# Patient Record
Sex: FEMALE | Race: WHITE | Employment: OTHER | URBAN - METROPOLITAN AREA
[De-identification: names, ages, dates, MRNs, and addresses within clinical notes are randomized per-mention and may not be internally consistent; named-entity substitution may affect disease eponyms.]

---

## 2021-06-28 ENCOUNTER — OFFICE VISIT (OUTPATIENT)
Dept: PODIATRY | Age: 61
End: 2021-06-28
Payer: COMMERCIAL

## 2021-06-28 VITALS — BODY MASS INDEX: 27.31 KG/M2 | WEIGHT: 160 LBS | HEIGHT: 64 IN

## 2021-06-28 DIAGNOSIS — M24.20 LIGAMENT LAXITY: Primary | ICD-10-CM

## 2021-06-28 DIAGNOSIS — M54.50 LUMBAR PAIN: ICD-10-CM

## 2021-06-28 PROCEDURE — 99203 OFFICE O/P NEW LOW 30 MIN: CPT | Performed by: PODIATRIST

## 2021-06-28 RX ORDER — HYDROCHLOROTHIAZIDE 12.5 MG/1
12.5 TABLET ORAL DAILY
COMMUNITY
Start: 2021-02-03

## 2021-06-28 RX ORDER — LORATADINE 10 MG/1
10 TABLET ORAL DAILY
COMMUNITY

## 2021-06-28 RX ORDER — MONTELUKAST SODIUM 10 MG/1
10 TABLET ORAL DAILY
COMMUNITY
Start: 2021-06-24

## 2021-06-28 RX ORDER — VIT C/B6/B5/MAGNESIUM/HERB 173 50-5-6-5MG
500 CAPSULE ORAL
COMMUNITY

## 2021-06-28 RX ORDER — ALENDRONATE SODIUM 70 MG/1
70 TABLET ORAL
COMMUNITY
Start: 2020-07-14

## 2021-06-28 RX ORDER — TRAZODONE HYDROCHLORIDE 100 MG/1
100 TABLET ORAL NIGHTLY
COMMUNITY
Start: 2021-02-03

## 2021-06-28 RX ORDER — MOMETASONE FUROATE 50 UG/1
2 SPRAY, METERED NASAL DAILY
COMMUNITY

## 2021-06-28 RX ORDER — LIDOCAINE 50 MG/G
1 PATCH TOPICAL EVERY 24 HOURS
COMMUNITY
Start: 2021-02-03

## 2021-06-28 RX ORDER — LOSARTAN POTASSIUM 100 MG/1
TABLET ORAL
COMMUNITY
Start: 2021-03-24

## 2021-06-28 RX ORDER — ESTRADIOL 0.1 MG/G
2 CREAM VAGINAL DAILY
COMMUNITY

## 2021-06-28 NOTE — PROGRESS NOTES
30 [facility] [address]18 [address] [facility] 36.  Dept: [phone]    NEW PATIENT PROGRESS NOTE  Date of patient's visit: 6/28/2021  Patient's Name:  Toña Patel YOB: 1960            No care team member to display        Chief Complaint   Patient presents with    New Patient    Foot Pain     bl with left more severe x several years         HPI:   Toña Patel is a 62 y.o. female who presents to the office today complaining of bilateral foot pain with he left more severe. Symptoms began several year(s) ago. Patient relates pain is Present. Pain is rated 2 out of 10 and is described as intermittent. Treatments prior to today's visit include: tylenol for pain. Currently denies F/C/N/V. Pt's primary care physician is Vinicius Chand last seen February 3 2021   She has pain to the light touch and it radiates all the way down her leg. All of her nerves are sensative all stemming from her back. Pt was diagnosed with fibromyalgia in the 90's but has not had her back checked   She states steroids have helped when she takes them orally     Allergies   Allergen Reactions    Codeine     Penicillins     Sulfa Antibiotics        History reviewed. No pertinent past medical history. Prior to Admission medications    Medication Sig Start Date End Date Taking?  Authorizing Provider   alendronate (FOSAMAX) 70 MG tablet Take 70 mg by mouth every 7 days 7/14/20  Yes Historical Provider, MD   hydroCHLOROthiazide (HYDRODIURIL) 12.5 MG tablet Take 12.5 mg by mouth daily 2/3/21  Yes Historical Provider, MD   lidocaine (LIDODERM) 5 % Place 1 patch onto the skin every 24 hours 2/3/21  Yes Historical Provider, MD   losartan (COZAAR) 100 MG tablet TAKE 1 TABLET DAILY 3/24/21  Yes Historical Provider, MD   montelukast (SINGULAIR) 10 MG tablet Take 10 mg by mouth daily 6/24/21  Yes Historical Provider, MD   traZODone (DESYREL) 100 MG tablet Take 100 mg by mouth nightly 2/3/21  Yes Historical Provider, MD   estradiol (ESTRACE) 0.1 MG/GM vaginal cream Place 2 g vaginally daily   Yes Historical Provider, MD   Omega-3 Fatty Acids (FISH OIL CONCENTRATE PO) Take by mouth   Yes Historical Provider, MD   loratadine (CLARITIN) 10 MG tablet Take 10 mg by mouth daily   Yes Historical Provider, MD   mometasone (NASONEX) 50 MCG/ACT nasal spray 2 sprays by Each Nostril route daily   Yes Historical Provider, MD   Multiple Vitamin (MULTIVITAMIN ADULT PO) Take by mouth   Yes Historical Provider, MD   Turmeric 500 MG CAPS Take 500 mg by mouth   Yes Historical Provider, MD       History reviewed. No pertinent surgical history. History reviewed. No pertinent family history. Social History     Tobacco Use    Smoking status: Never Smoker    Smokeless tobacco: Never Used   Substance Use Topics    Alcohol use: Not on file       Review of Systems    Review of Systems:   History obtained from chart review and the patient  General ROS: negative for - chills, fatigue, fever, night sweats or weight gain  Constitutional: Negative for chills, diaphoresis, fatigue, fever and unexpected weight change. Musculoskeletal: Positive for arthralgias, gait problem and joint swelling. Neurological ROS: negative for - behavioral changes, confusion, headaches or seizures. Negative for weakness and numbness. Dermatological ROS: negative for - mole changes, rash  Cardiovascular: Negative for leg swelling. Gastrointestinal: Negative for constipation, diarrhea, nausea and vomiting. Lower Extremity Physical Examination:   Vitals: There were no vitals filed for this visit. General: AAO x 3 in NAD. Dermatologic Exam:  Skin lesion/ulceration Absent . Skin No rashes or nodules noted. .       Musculoskeletal:       Patient is very hypermobile and has a very flexible forefoot  1st MPJ ROM decreased, Bilateral.  Muscle strength 5/5, Bilateral.  Pain present upon palpation of right and left common peroneal nerves. And deep peroneal nerves. Pain goes all the way down the leg. Medial longitudinal arch, Bilateral WNL. Ankle ROM WNL,Bilateral.    Dorsally contracted digits absent digits 1-5 Bilateral.     Vascular: DP and PT pulses palpable 2/4, Bilateral.  CFT <3 seconds, Bilateral.  Hair growth present to the level of the digits, Bilateral.  Edema absent, Bilateral.  Varicosities absent, Bilateral. Erythema absent, Bilateral    Neurological: Sensation intact to light touch to level of digits, Bilateral.  Protective sensation intact 10/10 sites via 5.07/10g [place]-Dena Monofilament, Bilateral.  negative Tinel's, Bilateral.  negative Valleix sign, Bilateral.      Integument: Warm, dry, supple, Bilateral.  Open lesion absent, Bilateral.  Interdigital maceration absent to web spaces 1-4, Bilateral.  Nails are normal in length, thickness and color 1-5 bilateral.  Fissures absent, Bilateral.     Asessment: Patient is a 62 y.o. female with:    Diagnosis Orders   1. Ligament laxity  Yocasta Urban MD, Pain Management, [place]   2. Lumbar pain  Jacob Pope MD, Pain Management, [place]         Plan: Patient examined and evaluated. Current condition and treatment options discussed in detail. Discussed conservative and surgical options with the patient. Advised pt to her condition. I am leaning against fibromyalgia and feel the pain is stemming from her lower lumbar. She has severe ligamentous laxity to both feet and to multiple joints of the foot and wrist.      I am referring her to a colleague that can assess her lumbar pain and see if her nerve pain stems from   . Verbal and written instructions given to patient. Contact office with any questions/problems/concerns. RTC in 4week(s)       Arch Pain: Exercises  Introduction  Here are some examples of exercises for you to try. The exercises may be suggested for a condition or for rehabilitation. Start each exercise slowly.  Ease off the exercises if you start to have pain. You will be told when to start these exercises and which ones will work best for you. How to do the exercises  Plantar fascia stretch    1. Sit in a chair and put your affected foot on your other knee. 2. Hold the heel of your foot in one hand, and grasp your toes with the other hand. 3. Pull on your heel (toward your body), and at the same time pull your toes back with your other hand. 4. You should feel a stretch along the bottom of your foot. 5. Hold 15 to 30 seconds. 6. Repeat 2 to 4 times. Plantar fascia stretch (kneeling)    1. Get on your hands and knees on the floor. Keep your heels pointing up and the balls of your feet and your toes on the floor. 2. Slowly sit back toward your ankles. 3. If this is too hard, you can try doing it one leg at a time. Stand up, and then kneel on one knee and keep the other leg forward. Place the foot of your forward leg flat on the ground and bend that knee. The heel on the leg still behind you should point up. The ball and toes of that foot should be on the floor. Sit back toward that ankle. 4. Hold 15 to 30 seconds. 5. Repeat 2 to 4 times. Switch legs if you are doing this one leg at a time. Plantar fascia self-massage    1. Sit in a chair. 2. Place your affected foot on a firm, tube-shaped object, such as a can or water bottle. 3. Roll your foot back and forth over the object to massage the bottom of your foot. 4. If you want to do ice massage, fill a water bottle about three-fourths of the way full and freeze before using. 5. Continue for 2 to 5 minutes. Bilateral calf stretch (knees straight)    1. Place a book on the floor a few inches from a wall or countertop, and put the balls of your feet on it. Your heels should be on the floor. The book needs to be thick enough so that you can feel a gentle stretch in each calf.  If you are not steady on your feet, hold on to a chair, counter, or wall while you do this stretch. 2. Keep your knees straight, and lean forward until you feel a stretch in each calf. 3. To get more stretch, add another book or use a thicker book, such as a phone book, a dictionary, or an encyclopedia. 4. Hold the stretch for at least 15 to 30 seconds. 5. Repeat 2 to 4 times. Bilateral calf stretch (knees bent)    1. Place a book on the floor a few inches from a wall or countertop, and put the balls of your feet on it. Your heels should be on the floor. The book needs to be thick enough so that you can feel a gentle stretch in each calf. If you are not steady on your feet, hold on to a chair, counter, or wall while you do this stretch. 2. Bend your knees, and lean forward until you feel a stretch in each calf. 3. To get more stretch, add another book or use a thicker book, such as a phone book, a dictionary, or an encyclopedia. 4. Hold the stretch for at least 15 to 30 seconds. 5. Repeat 2 to 4 times. Watkins pick-ups    1. Put some marbles on the floor next to a cup.  2. Sit down, and use the toes of your affected foot to lift up one marble from the floor at a time. Then try to put the marble in the cup.  3. Repeat 8 to 12 times. Towel scrunches    1. Sit down, and place your affected foot on a towel on the floor. You may also do this with both feet on the towel. 2. Scrunch the towel toward you with your toes. Then use your toes to push the towel back into place. 3. Repeat 8 to 12 times. Heel raises on a step    1. Stand on the bottom step of a staircase, facing up toward the stairs. Put the balls of your feet on the step. If you are not steady on your feet, hold on to the banister or wall. 2. Keeping both knees straight, slowly lift your heels above the step so that you are standing on your toes. Then slowly lower your heels below the step and toward the floor. 3. Return to the starting position, with your feet even with the step. 4. Repeat 8 to 12 times.

## 2022-06-06 ENCOUNTER — OFFICE VISIT (OUTPATIENT)
Dept: PODIATRY | Age: 62
End: 2022-06-06
Payer: COMMERCIAL

## 2022-06-06 VITALS — HEIGHT: 64 IN | WEIGHT: 160 LBS | BODY MASS INDEX: 27.31 KG/M2

## 2022-06-06 DIAGNOSIS — M19.079 ARTHRITIS OF MIDFOOT: ICD-10-CM

## 2022-06-06 DIAGNOSIS — M67.479 GANGLION CYST OF FOOT: ICD-10-CM

## 2022-06-06 DIAGNOSIS — L03.031 PARONYCHIA OF GREAT TOE OF RIGHT FOOT: Primary | ICD-10-CM

## 2022-06-06 DIAGNOSIS — M79.604 PAIN OF RIGHT LOWER EXTREMITY: ICD-10-CM

## 2022-06-06 PROCEDURE — 99214 OFFICE O/P EST MOD 30 MIN: CPT | Performed by: PODIATRIST

## 2022-06-06 PROCEDURE — 11750 EXCISION NAIL&NAIL MATRIX: CPT | Performed by: PODIATRIST

## 2022-06-06 NOTE — PROGRESS NOTES
[address] [facility] PODIATRY [facility]  [address] [address] [place] [address]  Dept: [phone]  Dept Fax: [phone]    RETURN PATIENT PROGRESS NOTE  Date of patient's visit: 6/6/2022  Patient's Name:  Lolita Barthel YOB: 1960            Patient Care Team:  Valeriano Gifford as PCP - General       Lolita Barthel 65 y.o. female that presents for follow-up of   Chief Complaint   Patient presents with    Toe Pain     right great toe pain for a few months     Pt's primary care physician is Valeriano Gifford last seen 02/03/2021  Symptoms began 3 month(s) ago and are decreased . Patient relates pain is Present to right great toenail. Pain is rated 1 out of 10 and is described as intermittent, mild. Treatments prior to today's visit include: none. Currently denies F/C/N/V. Allergies   Allergen Reactions    Codeine     Penicillins     Sulfa Antibiotics        No past medical history on file. Prior to Admission medications    Medication Sig Start Date End Date Taking?  Authorizing Provider   alendronate (FOSAMAX) 70 MG tablet Take 70 mg by mouth every 7 days 7/14/20  Yes Historical Provider, MD   hydroCHLOROthiazide (HYDRODIURIL) 12.5 MG tablet Take 12.5 mg by mouth daily 2/3/21  Yes Historical Provider, MD   lidocaine (LIDODERM) 5 % Place 1 patch onto the skin every 24 hours 2/3/21  Yes Historical Provider, MD   losartan (COZAAR) 100 MG tablet TAKE 1 TABLET DAILY 3/24/21  Yes Historical Provider, MD   montelukast (SINGULAIR) 10 MG tablet Take 10 mg by mouth daily 6/24/21  Yes Historical Provider, MD   traZODone (DESYREL) 100 MG tablet Take 100 mg by mouth nightly 2/3/21  Yes Historical Provider, MD   estradiol (ESTRACE) 0.1 MG/GM vaginal cream Place 2 g vaginally daily   Yes Historical Provider, MD   Omega-3 Fatty Acids (FISH OIL CONCENTRATE PO) Take by mouth   Yes Historical Provider, MD   loratadine (CLARITIN) 10 MG tablet Take 10 mg by mouth daily   Yes Historical Provider, MD   mometasone (NASONEX) 50 MCG/ACT nasal spray 2 sprays by Each Nostril route daily   Yes Historical Provider, MD   Multiple Vitamin (MULTIVITAMIN ADULT PO) Take by mouth   Yes Historical Provider, MD   Turmeric 500 MG CAPS Take 500 mg by mouth   Yes Historical Provider, MD       Review of Systems    Review of Systems:  History obtained from chart review and the patient  General ROS: negative for - chills, fatigue, fever, night sweats or weight gain  Constitutional: Negative for chills, diaphoresis, fatigue, fever and unexpected weight change. Musculoskeletal: Positive for arthralgias, gait problem and joint swelling. Neurological ROS: negative for - behavioral changes, confusion, headaches or seizures. Negative for weakness and numbness. Dermatological ROS: negative for - mole changes, rash  Cardiovascular: Negative for leg swelling. Gastrointestinal: Negative for constipation, diarrhea, nausea and vomiting. Lower Extremity Physical Examination:     Vitals: There were no vitals filed for this visit. General: AAO x 3 in NAD. Dermatologic Exam:  Right lateral hallux nail is incurvated with edema, erythema    Musculoskeletal:     1st MPJ ROM decreased, Bilateral. There is noted soft tissue, alicia exostosis to dorsal medial left foot  Muscle strength 5/5, Bilateral.  Pain present upon palpation of left foot dorsally. Medial longitudinal arch, Bilateral WNL.   Ankle ROM WNL,Bilateral.    Dorsally contracted digits absent digits 1-5 Bilateral.     Vascular: DP and PT pulses palpable 2/4, Bilateral.  CFT <3 seconds, Bilateral.  Hair growth present to the level of the digits, Bilateral.  Edema absent, Bilateral.  Varicosities absent, Bilateral. Erythema absent, Bilateral    Neurological: Sensation intact to light touch to level of digits, Bilateral.  Protective sensation intact 10/10 sites via 5.07/10g [place]-Dena Monofilament, Bilateral.  negative Tinel's, Bilateral.  negative Valleix sign, Bilateral.      Integument: Warm, dry, supple, Bilateral.  Open lesion absent, Bilateral.  Interdigital maceration absent to web spaces 1-4, Bilateral.  Nails are normal in length, thickness and color 1-5 bilateral.  Fissures absent, Bilateral.       Asessment: Patient is a 65 y.o. female with:    Diagnosis Orders   1. Paronychia of great toe of right foot  CT REMOVAL OF NAIL BED   2. Pain of right lower extremity  CT REMOVAL OF NAIL BED   3. Arthritis of midfoot     4. Ganglion cyst of foot           Plan: Patient examined and evaluated. Current condition and treatment options discussed in detail. Verbal consent obtained for chemical matrixectomy after all questions answered. Local anesthesia obtained via Hallux block to the Right hallux utilizing 5 cc of 1% Lidocaine plain. Next the Right hallux was prepped with Betadine. A digital tourniquet was applied. A freer elevator was used to free the lateral nail border. An english anvil was used to remove the offending border in total.  A curette was used to ensure the offending border was free of any remaining nail. Next, three 30 second applications of 13% Phenol were applied to the offending nail border followed by an isopropyl alcohol flush. Triple antibiotic ointment was applied to the nail border followed by a semi-compressive dressing. The patient was instructed to leave this dressing clean/dry/intact until the following am at which point they will begin warm epsom salt soaks followed by application of antibiotic ointment and Band-Aid BID. Patient instructed to take Tylenol PRN pain. Post-operative chemical matrixectomy instruction sheet dispensed to patient. Advised pt to consider xray for surgical planning left foot lesion. Verbal and written instructions given to patient. Contact office with any questions/problems/concerns.      No orders of the defined types were placed in this encounter. No orders of the defined types were placed in this encounter. RTC in 1week(s).     6/6/2022      Electronically signed by Carley Bunn DPM on 6/6/2022 at 10:59 AM  6/6/2022

## 2022-06-15 ENCOUNTER — OFFICE VISIT (OUTPATIENT)
Dept: PODIATRY | Age: 62
End: 2022-06-15

## 2022-06-15 VITALS — HEIGHT: 64 IN | BODY MASS INDEX: 27.31 KG/M2 | WEIGHT: 160 LBS

## 2022-06-15 DIAGNOSIS — M79.672 LEFT FOOT PAIN: Primary | ICD-10-CM

## 2022-06-15 DIAGNOSIS — M19.079 ARTHRITIS OF MIDFOOT: ICD-10-CM

## 2022-06-15 DIAGNOSIS — M67.472 GANGLION CYST OF LEFT FOOT: ICD-10-CM

## 2022-06-15 PROCEDURE — 99024 POSTOP FOLLOW-UP VISIT: CPT | Performed by: PODIATRIST

## 2022-06-15 NOTE — PROGRESS NOTES
[address] [facility]RY [facility]  [address] [address] [place] [address]  Dept: [phone]  Dept Fax: [phone]    POST-OP PROGRESS NOTE  Date of patient's visit: 6/15/2022  Patient's Name:  Shante Pryor YOB: 1960            Patient Care Team:  Amador Maynard as PCP - General        Chief Complaint   Patient presents with    Post-Op Check     1 wk post op    Ingrown Toenail     right great toe       Pt's primary care physician is Amador Maynard last seen 06/13/2022     Subjective: Shante Pryor is a 65 y.o. female who presents to the office today 1week(s)  S/P right hallux P&A   Problem List Items Addressed This Visit     None      . Patient relates pain is Present and improved. Pain is rated 5 out of 10 and is described as intermittent. Currently denies F/C/N/V. She also c/o pain to left foot along bony bump to top of her midfoot. Physical Examination:  Dermatologic Exam:  Skin lesion/ulceration Absent . Skin No rashes or nodules noted. .       Musculoskeletal:     1st MPJ ROM decreased, Bilateral. +cyst noted to dorsal medial midfoot. Muscle strength 5/5, Bilateral.  Pain present upon palpation of left foot bump to dorsal foot. Medial longitudinal arch, Bilateral WNL.   Ankle ROM WNL,Bilateral.    Dorsally contracted digits absent digits 1-5 Bilateral.     Vascular: DP and PT pulses palpable 2/4, Bilateral.  CFT <3 seconds, Bilateral.  Hair growth present to the level of the digits, Bilateral.  Edema absent, Bilateral.  Varicosities absent, Bilateral. Erythema absent, Bilateral    Neurological: Sensation intact to light touch to level of digits, Bilateral.  Protective sensation intact 10/10 sites via 5.07/10g [place]-Dena Monofilament, Bilateral.  negative Tinel's, Bilateral.  negative Valleix sign, Bilateral.      Integument: Warm, dry, supple, Bilateral.  Open lesion absent, Bilateral.  Interdigital maceration absent to web spaces 1-4, Bilateral.  Nails are normal in length, thickness and color 1-5 bilateral.  Fissures absent, Bilateral.       Assessment: Elenobonnie Labrum is status post as above  Normal post operative course. Doing well   Diagnosis Orders   1. Left foot pain  XR FOOT LEFT (MIN 3 VIEWS)   2. Arthritis of midfoot     3. Ganglion cyst of left foot             No diagnosis found. Plan:  Patient examined and evaluated. Current condition and treatment options discussed in detail. Advised pt to get xrays, left foot for further evaluation and possible surgical planning. Soak right foot once daily and monitor for infection. Verbal and written instructions given to patient. Orders: No orders of the defined types were placed in this encounter. Contact office with any questions/problems/concerns. RTC in 2week(s).      Electronically signed by Radha Dang DPM on 6/15/2022 at 1:19 PM  6/15/2022

## 2022-09-08 ENCOUNTER — HOSPITAL ENCOUNTER (OUTPATIENT)
Facility: CLINIC | Age: 62
Discharge: HOME | End: 2022-09-10
Payer: COMMERCIAL

## 2022-09-08 ENCOUNTER — HOSPITAL ENCOUNTER (OUTPATIENT)
Dept: GENERAL RADIOLOGY | Facility: CLINIC | Age: 62
Discharge: HOME | End: 2022-09-10
Payer: COMMERCIAL

## 2022-09-08 DIAGNOSIS — M79.672 LEFT FOOT PAIN: ICD-10-CM

## 2022-09-08 PROCEDURE — 73630 X-RAY EXAM OF FOOT: CPT

## 2022-10-19 ENCOUNTER — OFFICE VISIT (OUTPATIENT)
Dept: PODIATRY | Age: 62
End: 2022-10-19
Payer: COMMERCIAL

## 2022-10-19 VITALS — HEIGHT: 64 IN | WEIGHT: 160 LBS | BODY MASS INDEX: 27.31 KG/M2

## 2022-10-19 DIAGNOSIS — M79.604 PAIN OF RIGHT LOWER EXTREMITY: ICD-10-CM

## 2022-10-19 DIAGNOSIS — M19.079 ARTHRITIS OF MIDFOOT: ICD-10-CM

## 2022-10-19 DIAGNOSIS — M79.672 LEFT FOOT PAIN: Primary | ICD-10-CM

## 2022-10-19 DIAGNOSIS — M24.20 LIGAMENT LAXITY: ICD-10-CM

## 2022-10-19 PROCEDURE — 99213 OFFICE O/P EST LOW 20 MIN: CPT | Performed by: PODIATRIST

## 2022-10-19 NOTE — PATIENT INSTRUCTIONS
Schedule a Vaccine  When you qualify to receive the vaccine, call the [facility]) COVID-19 Vaccination Hotline to schedule your appointment or to get additional information about the [facility]) locations which are offering the COVID-19 vaccine. To be 94% effective, it's important that you receive two doses of one of the COVID-19 vaccines. -If you are receiving the Rey Peter vaccine, your second shot will be scheduled as close to 21 days after the first shot as possible. -If you are receiving the Moderna vaccine, your second shot will be scheduled as close to 28 days after the first shot as possible. [facility]) COVID-19 Vaccination Hotline: [phone]    Links to [facility]) website and [facility] website:    [email or web address]    [email or web address]

## 2022-10-19 NOTE — PROGRESS NOTES
[address] [facility] PODIATRY [facility]  [address] [facility] [address] [place] [address]  Dept: [phone]  Dept Fax: [phone]    RETURN PATIENT PROGRESS NOTE  Date of patient's visit: 10/19/2022  Patient's Name:  Jonnie Boucher YOB: 1960            Patient Care Team:  Carol Hernandez as PCP - General       Jonnie Boucher 65 y.o. female that presents for follow-up of   Chief Complaint   Patient presents with    Foot Pain     Bilateral foot - rtc 4 - 5 months       Symptoms began 5 month(s) ago and are unchanged . Patient relates pain is Present. Pain is rated 5 out of 10 and is described as constant, mild, moderate. Treatments prior to today's visit include: previous podiatry treatment. Currently denies F/C/N/V. Allergies   Allergen Reactions    Codeine     Penicillins     Sulfa Antibiotics        No past medical history on file. Prior to Admission medications    Medication Sig Start Date End Date Taking?  Authorizing Provider   alendronate (FOSAMAX) 70 MG tablet Take 70 mg by mouth every 7 days 7/14/20  Yes Historical Provider, MD   hydroCHLOROthiazide (HYDRODIURIL) 12.5 MG tablet Take 12.5 mg by mouth daily 2/3/21  Yes Historical Provider, MD   lidocaine (LIDODERM) 5 % Place 1 patch onto the skin every 24 hours 2/3/21  Yes Historical Provider, MD   losartan (COZAAR) 100 MG tablet TAKE 1 TABLET DAILY 3/24/21  Yes Historical Provider, MD   montelukast (SINGULAIR) 10 MG tablet Take 10 mg by mouth daily 6/24/21  Yes Historical Provider, MD   traZODone (DESYREL) 100 MG tablet Take 100 mg by mouth nightly 2/3/21  Yes Historical Provider, MD   estradiol (ESTRACE) 0.1 MG/GM vaginal cream Place 2 g vaginally daily   Yes Historical Provider, MD   Omega-3 Fatty Acids (FISH OIL CONCENTRATE PO) Take by mouth   Yes Historical Provider, MD   loratadine (CLARITIN) 10 MG tablet Take 10 mg by mouth daily   Yes Historical Provider, MD   mometasone (NASONEX) 50 MCG/ACT nasal spray 2 sprays by Each Nostril route daily   Yes Historical Provider, MD   Multiple Vitamin (MULTIVITAMIN ADULT PO) Take by mouth   Yes Historical Provider, MD   Turmeric 500 MG CAPS Take 500 mg by mouth   Yes Historical Provider, MD       Review of Systems    Review of Systems:  History obtained from chart review and the patient  General ROS: negative for - chills, fatigue, fever, night sweats or weight gain  Constitutional: Negative for chills, diaphoresis, fatigue, fever and unexpected weight change. Musculoskeletal: Positive for arthralgias, gait problem and joint swelling. Neurological ROS: negative for - behavioral changes, confusion, headaches or seizures. Negative for weakness and numbness. Dermatological ROS: negative for - mole changes, rash  Cardiovascular: Negative for leg swelling. Gastrointestinal: Negative for constipation, diarrhea, nausea and vomiting. Lower Extremity Physical Examination:     Vitals: There were no vitals filed for this visit. General: AAO x 3 in NAD. Dermatologic Exam:  Skin lesion/ulceration Absent . Skin No rashes or nodules noted. .       Musculoskeletal:     1st MPJ ROM decreased, Bilateral.  Muscle strength 5/5, Bilateral.  Pain present upon palpation of plantar arch and sub 2-5 MTH and plantar heel. Medial longitudinal arch, Bilateral WNL.   Ankle ROM WNL,Bilateral.    Dorsally contracted digits absent digits 1-5 Bilateral.     Vascular: DP and PT pulses palpable 2/4, Bilateral.  CFT <3 seconds, Bilateral.  Hair growth present to the level of the digits, Bilateral.  Edema absent, Bilateral.  Varicosities absent, Bilateral. Erythema absent, Bilateral    Neurological: Sensation intact to light touch to level of digits, Bilateral.  Protective sensation intact 10/10 sites via 5.07/10g [place]ena Monofilament, Bilateral.  negative Tinel's, Bilateral.  negative Valleix sign, Bilateral.      Integument: Warm, dry, supple, Bilateral.  Open lesion absent, Bilateral.  Interdigital maceration absent to web spaces 1-4, Bilateral.  Nails are normal in length, thickness and color 1-5 bilateral.  Fissures absent, Bilateral.     Xrays, left foot, 3 views: There is no acute osseous abnormality. The joint spaces are maintained. The   surrounding soft tissues are unremarkable. Asessment: Patient is a 65 y.o. female with:    Diagnosis Orders   1. Left foot pain  Rena Covington MD, Pain Management, [place]      2. Pain of right lower extremity  Rena oCvington MD, Pain Management, [address] [place] [address]      3. Arthritis of midfoot        4. Ligament laxity              Plan: Patient examined and evaluated. Current condition and treatment options discussed in detail. Advised pt to see pain management and referral placed today. As patient struggles with fibromyalgia, she is experiencing significant back pain. For the DJD  Recommend OTC anti inflammatories. RICE therapy if pain worsens after activity   Recommend proper shoe gear with supportive archs. All labs were reviewed and all imagining including the above findings were reviewed PRIOR to the patients arrival and with the patient today. Previous patient encounter was reviewed. Encounters from the patients other medical providers were reviewed and noted. Time was spent educating the patient on proper care of the feet and ankles. All the above diagnosis were addressed at todays visit and all questions were answered. A total of 25 minutes was spent with this patients encounter which included charting after the patients visit     Verbal and written instructions given to patient. Contact office with any questions/problems/concerns. No orders of the defined types were placed in this encounter. No orders of the defined types were placed in this encounter. RTC in 2month(s).     10/19/2022      Electronically signed by Dakotah Masters DPM on 10/19/2022 at 3:29 PM  10/19/2022

## 2023-01-30 ENCOUNTER — OFFICE VISIT (OUTPATIENT)
Dept: PODIATRY | Age: 63
End: 2023-01-30
Payer: COMMERCIAL

## 2023-01-30 VITALS — WEIGHT: 160 LBS | BODY MASS INDEX: 27.31 KG/M2 | HEIGHT: 64 IN

## 2023-01-30 DIAGNOSIS — M79.604 PAIN OF RIGHT LOWER EXTREMITY: Primary | ICD-10-CM

## 2023-01-30 DIAGNOSIS — L03.031 PARONYCHIA OF GREAT TOE OF RIGHT FOOT: ICD-10-CM

## 2023-01-30 PROCEDURE — 99214 OFFICE O/P EST MOD 30 MIN: CPT | Performed by: PODIATRIST

## 2023-01-30 PROCEDURE — 11750 EXCISION NAIL&NAIL MATRIX: CPT | Performed by: PODIATRIST

## 2023-02-01 NOTE — PROGRESS NOTES
[address] [facility] PODIATRY [facility]  [address] [address] [facility] [address]  Dept: [phone]  Dept Fax: [phone]    RETURN PATIENT PROGRESS NOTE  Date of patient's visit: 2/1/2023  Patient's Name:  Demario Smith YOB: 1960            Patient Care Team:  Franca Fu as PCP - General       Demario Smith 65 y.o. female that presents for follow-up of   Chief Complaint   Patient presents with    Ingrown Toenail     Possible ingrown of great toe of right foot      Pt's primary care physician is Franca Fu last seen 02/03/2021  Symptoms began 3 month(s) ago and are decreased . Patient relates pain is Present to right great toenail. Pain is rated 1 out of 10 and is described as intermittent, mild. Treatments prior to today's visit include: none. Currently denies F/C/N/V. Allergies   Allergen Reactions    Codeine     Penicillins     Sulfa Antibiotics        No past medical history on file. Prior to Admission medications    Medication Sig Start Date End Date Taking?  Authorizing Provider   tretinoin (RETIN-A) 0.025 % cream  1/2/23  Yes Historical Provider, MD   alendronate (FOSAMAX) 70 MG tablet Take 70 mg by mouth every 7 days 7/14/20  Yes Historical Provider, MD   hydroCHLOROthiazide (HYDRODIURIL) 12.5 MG tablet Take 12.5 mg by mouth daily 2/3/21  Yes Historical Provider, MD   lidocaine (LIDODERM) 5 % Place 1 patch onto the skin every 24 hours 2/3/21  Yes Historical Provider, MD   losartan (COZAAR) 100 MG tablet TAKE 1 TABLET DAILY 3/24/21  Yes Historical Provider, MD   montelukast (SINGULAIR) 10 MG tablet Take 10 mg by mouth daily 6/24/21  Yes Historical Provider, MD   traZODone (DESYREL) 100 MG tablet Take 100 mg by mouth nightly 2/3/21  Yes Historical Provider, MD   estradiol (ESTRACE) 0.1 MG/GM vaginal cream Place 2 g vaginally daily   Yes Historical Provider, MD   Omega-3 Fatty Acids (FISH OIL CONCENTRATE PO) Take by mouth   Yes Historical Provider, MD   loratadine (CLARITIN) 10 MG tablet Take 10 mg by mouth daily   Yes Historical Provider, MD   mometasone (NASONEX) 50 MCG/ACT nasal spray 2 sprays by Each Nostril route daily   Yes Historical Provider, MD   Multiple Vitamin (MULTIVITAMIN ADULT PO) Take by mouth   Yes Historical Provider, MD   Turmeric 500 MG CAPS Take 500 mg by mouth   Yes Historical Provider, MD       Review of Systems    Review of Systems:  History obtained from chart review and the patient  General ROS: negative for - chills, fatigue, fever, night sweats or weight gain  Constitutional: Negative for chills, diaphoresis, fatigue, fever and unexpected weight change. Musculoskeletal: Positive for arthralgias, gait problem and joint swelling. Neurological ROS: negative for - behavioral changes, confusion, headaches or seizures. Negative for weakness and numbness. Dermatological ROS: negative for - mole changes, rash  Cardiovascular: Negative for leg swelling. Gastrointestinal: Negative for constipation, diarrhea, nausea and vomiting. Lower Extremity Physical Examination:     Vitals: There were no vitals filed for this visit. General: AAO x 3 in NAD. Dermatologic Exam:  Right lateral hallux nail is incurvated with edema, erythema    Musculoskeletal:     1st MPJ ROM decreased, Bilateral. There is noted soft tissue, alicia exostosis to dorsal medial left foot  Muscle strength 5/5, Bilateral.  Pain present upon palpation of left foot dorsally. Medial longitudinal arch, Bilateral WNL.   Ankle ROM WNL,Bilateral.    Dorsally contracted digits absent digits 1-5 Bilateral.     Vascular: DP and PT pulses palpable 2/4, Bilateral.  CFT <3 seconds, Bilateral.  Hair growth present to the level of the digits, Bilateral.  Edema absent, Bilateral.  Varicosities absent, Bilateral. Erythema absent, Bilateral    Neurological: Sensation intact to light touch to level of digits, Bilateral.  Protective sensation intact 10/10 sites via 5.07/10g [place]-Dena Monofilament, Bilateral.  negative Tinel's, Bilateral.  negative Valleix sign, Bilateral.      Integument: Warm, dry, supple, Bilateral.  Open lesion absent, Bilateral.  Interdigital maceration absent to web spaces 1-4, Bilateral.  Nails are normal in length, thickness and color 1-5 bilateral.  Fissures absent, Bilateral.       Asessment: Patient is a 62 y.o. female with:    Diagnosis Orders   1. Pain of right lower extremity  FL EXCISION NAIL MATRIX PERMANENT REMOVAL      2. Paronychia of great toe of right foot  FL EXCISION NAIL MATRIX PERMANENT REMOVAL            Plan: Patient examined and evaluated.  Current condition and treatment options discussed in detail.       Verbal consent obtained for chemical matrixectomy after all questions answered.  Local anesthesia obtained via Hallux block to the Right hallux utilizing 5 cc of 1% Lidocaine plain.  Next the Right hallux was prepped with Betadine.  A digital tourniquet was applied.  A freer elevator was used to free the lateral nail border.  An english anvil was used to remove the offending border in total.  A curette was used to ensure the offending lateral border was free of any remaining nail.  Next, three 30 second applications of 89% Phenol were applied to the offending nail border followed by an isopropyl alcohol flush.  Triple antibiotic ointment was applied to the nail border followed by a semi-compressive dressing.      The patient was instructed to leave this dressing clean/dry/intact until the following am at which point they will begin warm epsom salt soaks followed by application of antibiotic ointment and Band-Aid BID.  Patient instructed to take Tylenol PRN pain. Post-operative chemical matrixectomy instruction sheet dispensed to patient.            No orders of the defined types were placed in this encounter.    No orders of the defined types were placed in this encounter.       RTC in 1week(s).   1/30/2023      Electronically signed by Baldo You DPM on 2/1/2023 at 11:22 AM  1/30/2023

## 2023-02-14 ENCOUNTER — OFFICE VISIT (OUTPATIENT)
Dept: PODIATRY | Age: 63
End: 2023-02-14

## 2023-02-14 VITALS — HEIGHT: 64 IN | BODY MASS INDEX: 27.31 KG/M2 | WEIGHT: 160 LBS

## 2023-02-14 DIAGNOSIS — Z98.890 POST-OPERATIVE STATE: Primary | ICD-10-CM

## 2023-02-14 PROCEDURE — 99024 POSTOP FOLLOW-UP VISIT: CPT | Performed by: PODIATRIST

## 2023-02-19 NOTE — PROGRESS NOTES
[address] [place] [address],[address] PODIATRY [facility]  [address]  [address]  Dept: [phone]  Dept Fax: [phone]    POST-OP PROGRESS NOTE  Date of patient's visit: 2/19/2023  Patient's Name:  Wiliam Hutson YOB: 1960            Patient Care Team:  Arley Munoz as PCP - General        Chief Complaint   Patient presents with    Post-Op Check     2 week post op check ingrown toenail right foot        Pt's primary care physician is Arley Munoz    Subjective: Wiliam Hutson is a 65 y.o. female who presents to the office today 2week(s)  S/P right hallux P&A lateral border   Problem List Items Addressed This Visit    None  . Patient relates pain is Present and improved. Pain is rated 2 out of 10 and is described as mild. Currently denies F/C/N/V. Physical Examination:   Minimal bleeding post operatively. Edema present. No erythema. No Pus. Operative correction is satisfactory. Assessment: Wiliam Hutson is status post as above  Normal post operative course. Doing well          Plan:  Patient examined and evaluated. Current condition and treatment options discussed in detail. Advised pt to monitor for infection. Verbal and written instructions given to patient. Orders: No orders of the defined types were placed in this encounter. Contact office with any questions/problems/concerns. RTC in 3month(s).      Electronically signed by Brooke Henley DPM on 2/19/2023 at 12:28 PM  2/14/2023

## 2023-09-13 ENCOUNTER — HOSPITAL ENCOUNTER (OUTPATIENT)
Age: 63
Setting detail: THERAPIES SERIES
Discharge: HOME | End: 2023-09-13
Payer: COMMERCIAL

## 2023-09-13 PROCEDURE — 97162 PT EVAL MOD COMPLEX 30 MIN: CPT

## 2023-09-13 PROCEDURE — 97110 THERAPEUTIC EXERCISES: CPT

## 2023-09-14 NOTE — THERAPY EVALUATION
[x] [address] [facility] [address] [facility]  [address]  [phone]  F: [phone]      Physical Therapy Spine Evaluation     Date:  2023  Patient: Bogdan Swann              : 1960                        MRN: [id number]  Physician: Shalom Henson DO                              Insurance: [facility]  Medical Diagnosis: chronic R shoulder pain; chronic right-sided LBP w/o sciatica                        Rehab Codes: M25.511, M54.59  Onset Date:                  Next 's appt.: TBD     Subjective:   CC: pt c/o intermittent ant R shoulder pain made worse w/ reaching overhead, taking off a shirt overhead, vacuuming     Pt c/o intermittent LBP that is made worse w/ sitting 1 hr, vacuuming, and lifting  HPI: (23) pt c/o insidious onset R shoulder pain 2 months ago and LBP for approx 5 years; 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     PMHx: [] Unremarkable        [] Diabetes     [] HTN                        [] Pacemaker              [] MI/Heart Problems [] Cancer       [] Arthritis       [x] Other: fibromyalgia              [x] Refer to full medical chart  In EPIC         Comorbidities:   [] Obesity [] Dialysis  [] N/A   [] Asthma/COPD [] Dementia [] Other:   [] Stroke [] Sleep apnea [] Other:   [] Vascular disease [] Rheumatic disease [] Other:      Tests:  [] X-Ray:        [] MRI:                       [] Other:     Medications: [x] Refer to full medical record            [] None          [] Other:  Allergies:      [x] Refer to full medical record [] None          [] Other:     Function:  Hand Dominance  [x] Right  [] Left  Patient lives with: spouse   In what type of home []  One story   [] Two story   [] Split level   Number of stairs to enter     With handrail on the []  Right to enter   [] Left to enter   Hugo Miller has a []  Tub only  [] Tub/shower combo   [] Walk in shower    []  Grab bars
Deficit Deficit Not Tested Comments   Palpation [] [x] [] Painful ant/med R knee   Sensation [x] [] []    Neurological [x] [] []        Functional Test: WOMAC - 9 Score: 9% functionally impaired     Comments:    Assessment:  Patient would benefit from skilled physical therapy services in order to: improve ability to transfers and navigate stairs    Problem list, as detailed above:   [x] ? R knee Pain:    [] ? Cervical Pain:    [] ? ROM:     [x] ? Strength:   [x] ? Function:  [] Postural Deviations  [] Gait Deviations  [] Other:     STG: (to be met in 8 treatments)  ? Pain: by 50% w/ rising from toilet  ? ROM:  ? Strength: of R LE to 4/5 to improve transfer tolerance  ? Function:  Patient to be independent with home exercise program as demonstrated by performance with correct form without cues. LTG: (to be met in 16 treatments)  Improve WOMAC score to 0% impaired  Pt to note transfers from chair, car and toilet are painfree      Patient goals: pain relief    Rehab Potential:  [x] Good  [] Fair  [] Poor   Suggested Professional Referral:  [x] No  [] Yes:  Barriers to Goal Achievement:  [x] No  [] Yes:  Domestic Concerns:  [x] No  [] Yes:    Pt. Education:  [x] Plans/Goals, Risks/Benefits discussed  [x] Home exercise program  Method of Education: [x] Verbal  [x] Demo  [x] Written  Comprehension of Education:  [x] Verbalizes understanding. [x] Demonstrates understanding. [] Needs Review. [] Demonstrates/verbalizes understanding of HEP/Ed previously given.     Treatment Plan:  [x] Therapeutic Exercise   16197  [] Iontophoresis: 4 mg/mL Dexamethasone Sodium Phosphate  mAmin  52154   [] Therapeutic Activity  57341 [] Vasopneumatic cold with compression  43704    [] Gait Training   54423 [] Ultrasound   57418   [x] Neuromuscular Re-education  89472 [x] Electrical Stimulation Unattended  40930   [x] Manual Therapy  72540 [] Electrical Stimulation Attended  00600   [x] Instruction in HEP  [] Lumbar/Cervical